# Patient Record
Sex: MALE | Race: WHITE | NOT HISPANIC OR LATINO | Employment: FULL TIME | URBAN - METROPOLITAN AREA
[De-identification: names, ages, dates, MRNs, and addresses within clinical notes are randomized per-mention and may not be internally consistent; named-entity substitution may affect disease eponyms.]

---

## 2022-09-08 PROBLEM — L65.9 HAIR THINNING: Status: ACTIVE | Noted: 2022-09-08

## 2022-09-08 PROBLEM — Z87.19 HISTORY OF BLOODY STOOLS: Status: ACTIVE | Noted: 2022-09-08

## 2022-09-08 PROBLEM — R19.5 LOOSE STOOLS: Status: ACTIVE | Noted: 2022-09-08

## 2022-09-08 PROCEDURE — 86803 HEPATITIS C AB TEST: CPT | Performed by: STUDENT IN AN ORGANIZED HEALTH CARE EDUCATION/TRAINING PROGRAM

## 2022-09-08 PROCEDURE — 87389 HIV-1 AG W/HIV-1&-2 AB AG IA: CPT | Performed by: STUDENT IN AN ORGANIZED HEALTH CARE EDUCATION/TRAINING PROGRAM

## 2022-11-01 ENCOUNTER — LAB REQUISITION (OUTPATIENT)
Dept: LAB | Facility: HOSPITAL | Age: 22
End: 2022-11-01
Payer: COMMERCIAL

## 2022-11-01 DIAGNOSIS — R19.7 DIARRHEA, UNSPECIFIED: ICD-10-CM

## 2022-11-01 DIAGNOSIS — K62.5 HEMORRHAGE OF ANUS AND RECTUM: ICD-10-CM

## 2022-11-01 DIAGNOSIS — R15.2 FECAL URGENCY: ICD-10-CM

## 2022-11-01 LAB
IGA SERPL-MCNC: 251 MG/DL (ref 63–484)
TSH SERPL-ACNC: 1.53 UIU/ML (ref 0.35–4.94)

## 2022-11-01 PROCEDURE — 82784 ASSAY IGA/IGD/IGG/IGM EACH: CPT | Performed by: INTERNAL MEDICINE

## 2022-11-01 PROCEDURE — 84443 ASSAY THYROID STIM HORMONE: CPT | Performed by: INTERNAL MEDICINE

## 2022-11-01 PROCEDURE — 86003 ALLG SPEC IGE CRUDE XTRC EA: CPT | Performed by: INTERNAL MEDICINE

## 2022-11-01 PROCEDURE — 83516 IMMUNOASSAY NONANTIBODY: CPT | Performed by: INTERNAL MEDICINE

## 2022-11-03 LAB — TISSUE TRANSGLUTAMINASE IGA ANTIBODY (OHS) CATEGORY: NEGATIVE

## 2022-11-04 LAB
ALLERGEN ALMOND IGE (OLG): <0.1
ALLERGEN CASHEW NUT IGE (OLG): <0.1
ALLERGEN CODFISH IGE (OLG): <0.1
ALLERGEN CRAB IGE (OLG): <0.1
ALLERGEN EGG WHITE IGE (OLG): <0.1
ALLERGEN EGG YOLK IGE (OLG): <0.1
ALLERGEN HAZELNUT IGE (OLG): <0.1
ALLERGEN MILK IGE (OLG): <0.1
ALLERGEN PEANUT IGE (OLG): <0.1
ALLERGEN SALMON IGE (OLG): <0.1
ALLERGEN SCALLOP IGE (OLG): <0.1
ALLERGEN SESAME SEED IGE (OLG): <0.1
ALLERGEN SHRIMP IGE (OLG): 0.13
ALLERGEN SOY BEAN IGE (OLG): <0.1
ALLERGEN TUNA IGE (OLG): <0.1
ALLERGEN WALNUT IGE (OLG): <0.1
ALLERGEN WHEAT IGE (OLG): <0.1
PHADIOTOP IGE QN: 13.4 KU/L

## 2023-10-04 PROBLEM — R56.9 SEIZURES: Status: ACTIVE | Noted: 2023-10-04

## 2024-10-15 ENCOUNTER — OFFICE VISIT (OUTPATIENT)
Dept: URGENT CARE | Facility: CLINIC | Age: 24
End: 2024-10-15
Payer: COMMERCIAL

## 2024-10-15 VITALS
DIASTOLIC BLOOD PRESSURE: 72 MMHG | RESPIRATION RATE: 16 BRPM | HEIGHT: 66 IN | HEART RATE: 94 BPM | WEIGHT: 112 LBS | BODY MASS INDEX: 18 KG/M2 | SYSTOLIC BLOOD PRESSURE: 113 MMHG | OXYGEN SATURATION: 99 % | TEMPERATURE: 99 F

## 2024-10-15 DIAGNOSIS — J02.9 SORE THROAT: Primary | ICD-10-CM

## 2024-10-15 DIAGNOSIS — U07.1 COVID-19: ICD-10-CM

## 2024-10-15 LAB
CTP QC/QA: YES
MOLECULAR STREP A: NEGATIVE
POC MOLECULAR INFLUENZA A AGN: NEGATIVE
POC MOLECULAR INFLUENZA B AGN: NEGATIVE
SARS-COV-2 AG RESP QL IA.RAPID: POSITIVE

## 2024-10-15 PROCEDURE — 87811 SARS-COV-2 COVID19 W/OPTIC: CPT | Mod: QW,,,

## 2024-10-15 PROCEDURE — 99213 OFFICE O/P EST LOW 20 MIN: CPT | Mod: ,,,

## 2024-10-15 PROCEDURE — 87502 INFLUENZA DNA AMP PROBE: CPT | Mod: QW,,,

## 2024-10-15 PROCEDURE — 87651 STREP A DNA AMP PROBE: CPT | Mod: QW,,,

## 2024-10-15 NOTE — PATIENT INSTRUCTIONS
Covid positive, flu and strep negative     Fever / Body Aches: Use OTC Tylenol or Motrin per package instructions for fever or body aches.  Sore Throat: Use OTC lozenges or throat sprays, gargle with warm salt and water, warm tea with honey.   Cough: robitussin or delsym as needed for cough over the counter   Cover your mouth with coughing, avoid sharing cups or lip balm, wash your hand before eating or touching your face.     The updated Respiratory Virus Guidance recommends that people stay home and away from others until at least 24 hours after both their symptoms are getting better overall, and they have not had a fever (and are not using fever-reducing medication).  Follow up with you primary care doctor as needed.   For any new/worsening symptoms seek care immediately      Updated CDC guidelines can be found at: https://www.cdc.gov/coronavirus/2019-ncov/hcp/audvhp-jd-swnq.html

## 2024-10-15 NOTE — PROGRESS NOTES
"Dictation #1  MRN:[id number]  [facility]:[id number] Subjective:      Patient ID: Chase Arita is a 24 y.o. male.    Vitals:  height is 5' 6" (1.676 m) and weight is 50.8 kg (112 lb). His temperature is 98.8 °F (37.1 °C). His blood pressure is 113/72 and his pulse is 94. His respiration is 16 and oxygen saturation is 99%.     Chief Complaint: Sinus Problem    Patient is a 24 y.o. male who presents to urgent care with complaints of sinus pressure and congestion, headache, sore throat, body aches x Saturday evening, worsened Sunday morning. Alleviating factors include [place] with moderate amount of relief. Patient denies fever, cough, chest pain, SOB, or any other symptoms.     Sinus Problem  Associated symptoms include chills, congestion, diaphoresis and a sore throat. Pertinent negatives include no coughing or shortness of breath.     Constitution: Positive for chills, sweating and fatigue. Negative for fever.   HENT:  Positive for congestion, postnasal drip and sore throat. Negative for trouble swallowing and voice change.    Eyes: Negative.    Respiratory:  Negative for cough, shortness of breath, wheezing and asthma.    Musculoskeletal:  Positive for muscle cramps.   Allergic/Immunologic: Negative for asthma.      Objective:     Physical Exam   Constitutional: He is oriented to person, place, and time. He appears well-developed. He is cooperative.  Non-toxic appearance. He does not appear ill. No distress.   HENT:   Head: Normocephalic and atraumatic.   Ears:   Right Ear: Hearing and external ear normal. A middle ear effusion is present.   Left Ear: Hearing, tympanic membrane and external ear normal.   Nose: Congestion present.   Mouth/Throat: Mucous membranes are normal. Mucous membranes are moist. No posterior oropharyngeal erythema. Oropharynx is clear.   Eyes: Conjunctivae and lids are normal.   Neck: Trachea normal and phonation normal. Neck supple. No edema present. No erythema present. No neck rigidity " "present.   Cardiovascular: Normal rate, regular rhythm and normal heart sounds.   Pulmonary/Chest: Effort normal and breath sounds normal. No stridor. No respiratory distress. He has no decreased breath sounds. He has no wheezes. He has no rhonchi. He has no rales.   Abdominal: Normal appearance.   Neurological: He is alert and oriented to person, place, and time. He exhibits normal muscle tone.   Skin: Skin is warm, intact, not diaphoretic and no rash. Capillary refill takes less than 2 seconds.   Psychiatric: His speech is normal and behavior is normal. Mood normal.   Nursing note and vitals reviewed.       Previous History      Review of patient's allergies indicates:   Allergen Reactions    Augmentin [amoxicillin-pot clavulanate]     Tamiflu [oseltamivir]     Trileptal [oxcarbazepine]        Past Medical History:   Diagnosis Date    History of seizures as a child      Current Outpatient Medications   Medication Instructions    finasteride (PROPECIA) 1 mg, Oral, Daily    FLUoxetine 10 mg, Oral, Daily    lactose-reduced food/fiber (ENSURE/FIBER ORAL) Take by mouth.    multivit-minerals/folic acid (MULTIVITAMIN GUMMIES ORAL) Take by mouth.     Past Surgical History:   Procedure Laterality Date    TYMPANOSTOMY TUBE PLACEMENT  2004     Family History   Problem Relation Name Age of Onset    Hypertension Mother      No Known Problems Father         Social History     Tobacco Use    Smoking status: Never    Smokeless tobacco: Never   Substance Use Topics    Alcohol use: Yes     Comment: rarely    Drug use: Never        Physical Exam      Vital Signs Reviewed   /72   Pulse 94   Temp 98.8 °F (37.1 °C)   Resp 16   Ht 5' 6" (1.676 m)   Wt 50.8 kg (112 lb)   SpO2 99%   BMI 18.08 kg/m²        Procedures    Procedures     Labs     Results for orders placed or performed in visit on 10/15/24   SARS Coronavirus 2 Antigen, POCT Manual Read    Collection Time: 10/15/24 12:10 PM   Result Value Ref Range    SARS " Coronavirus 2 Antigen Positive (A) Negative     Acceptable Yes    POCT Strep A, Molecular    Collection Time: 10/15/24 12:13 PM   Result Value Ref Range    Molecular Strep A, POC Negative Negative     Acceptable Yes          Assessment:     1. Sore throat    2. COVID-19        Plan:       Sore throat  -     POCT Strep A, Molecular  -     POCT Influenza A/B MOLECULAR  -     SARS Coronavirus 2 Antigen, POCT Manual Read    COVID-19    Covid positive, flu and strep negative     Fever / Body Aches: Use OTC Tylenol or Motrin per package instructions for fever or body aches.  Sore Throat: Use OTC lozenges or throat sprays, gargle with warm salt and water, warm tea with honey.   Cough: robitussin or delsym as needed for cough over the counter   Cover your mouth with coughing, avoid sharing cups or lip balm, wash your hand before eating or touching your face.     The updated Respiratory Virus Guidance recommends that people stay home and away from others until at least 24 hours after both their symptoms are getting better overall, and they have not had a fever (and are not using fever-reducing medication).  Follow up with you primary care doctor as needed.   For any new/worsening symptoms seek care immediately      Updated CDC guidelines can be found at: https://www.cdc.gov/coronavirus/2019-ncov/hcp/bpcood-el-nuql.html

## 2024-10-15 NOTE — LETTER
October 15, 2024      [facility] [address] [address]  Phone: [phone]       Patient: Chase Arita   YOB: 2000  Date of Visit: 10/15/2024    To Whom It May Concern:    ROLANDO Arita  was at [facility] on 10/15/2024. The patient may return to work/school on 10/19/2024 with no restrictions. If you have any questions or concerns, or if I can be of further assistance, please do not hesitate to contact me.    Sincerely,    Carmella Watts LPN